# Patient Record
Sex: MALE | Race: WHITE | Employment: OTHER | URBAN - METROPOLITAN AREA
[De-identification: names, ages, dates, MRNs, and addresses within clinical notes are randomized per-mention and may not be internally consistent; named-entity substitution may affect disease eponyms.]

---

## 2023-05-01 ENCOUNTER — HOSPITAL ENCOUNTER (OUTPATIENT)
Dept: CT IMAGING | Age: 48
Discharge: HOME | End: 2023-05-01
Attending: FAMILY MEDICINE
Payer: COMMERCIAL

## 2023-05-01 DIAGNOSIS — R79.89 ELEVATED PLASMA METANEPHRINES: ICD-10-CM

## 2023-05-01 DIAGNOSIS — I10 RESISTANT HYPERTENSION: ICD-10-CM

## 2023-05-01 LAB — CREAT BLD-MCNC: 1.3 MG/DL (ref 0.6–1.3)

## 2023-05-01 PROCEDURE — 82565 ASSAY OF CREATININE: CPT

## 2023-05-01 PROCEDURE — 74177 CT ABD & PELVIS W/CONTRAST: CPT

## 2023-05-01 PROCEDURE — 74011000636 HC RX REV CODE- 636

## 2023-05-01 RX ADMIN — IOPAMIDOL 100 ML: 755 INJECTION, SOLUTION INTRAVENOUS at 08:21

## 2023-05-20 RX ORDER — ATENOLOL 50 MG/1
50 TABLET ORAL DAILY
COMMUNITY
Start: 2012-06-04

## 2023-05-20 RX ORDER — TRIAMTERENE AND HYDROCHLOROTHIAZIDE 37.5; 25 MG/1; MG/1
1 TABLET ORAL DAILY
COMMUNITY
Start: 2012-06-04

## 2023-05-20 RX ORDER — ONDANSETRON 4 MG/1
4 TABLET, ORALLY DISINTEGRATING ORAL EVERY 8 HOURS PRN
COMMUNITY
Start: 2014-03-07

## 2025-01-17 ENCOUNTER — HOSPITAL ENCOUNTER (INPATIENT)
Facility: HOSPITAL | Age: 50
LOS: 3 days | Discharge: HOME | DRG: 639 | End: 2025-01-20
Attending: STUDENT IN AN ORGANIZED HEALTH CARE EDUCATION/TRAINING PROGRAM | Admitting: STUDENT IN AN ORGANIZED HEALTH CARE EDUCATION/TRAINING PROGRAM
Payer: COMMERCIAL

## 2025-01-17 DIAGNOSIS — E11.9 DIABETES MELLITUS, NEW ONSET (HCC): ICD-10-CM

## 2025-01-17 DIAGNOSIS — E11.00 HYPEROSMOLAR HYPERGLYCEMIC STATE (HHS) (HCC): Primary | ICD-10-CM

## 2025-01-17 DIAGNOSIS — E11.00 UNCONTROLLED TYPE 2 DM WITH HYPEROSMOLAR NONKETOTIC HYPERGLYCEMIA (HCC): ICD-10-CM

## 2025-01-17 LAB
ALBUMIN SERPL-MCNC: 3.9 G/DL (ref 3.5–5)
ALBUMIN/GLOB SERPL: 1.1 (ref 1.1–2.2)
ALP SERPL-CCNC: 188 U/L (ref 45–117)
ALT SERPL-CCNC: 46 U/L (ref 12–78)
ANION GAP SERPL CALC-SCNC: 11 MMOL/L (ref 2–12)
AST SERPL-CCNC: 24 U/L (ref 15–37)
BASOPHILS # BLD: 0.04 K/UL (ref 0–0.1)
BASOPHILS NFR BLD: 0.4 % (ref 0–1)
BILIRUB SERPL-MCNC: 0.7 MG/DL (ref 0.2–1)
BUN SERPL-MCNC: 38 MG/DL (ref 6–20)
BUN/CREAT SERPL: 20 (ref 12–20)
CALCIUM SERPL-MCNC: 9.5 MG/DL (ref 8.5–10.1)
CHLORIDE SERPL-SCNC: 86 MMOL/L (ref 97–108)
CO2 SERPL-SCNC: 22 MMOL/L (ref 21–32)
CREAT SERPL-MCNC: 1.94 MG/DL (ref 0.7–1.3)
DIFFERENTIAL METHOD BLD: ABNORMAL
EOSINOPHIL # BLD: 0.11 K/UL (ref 0–0.4)
EOSINOPHIL NFR BLD: 1 % (ref 0–7)
ERYTHROCYTE [DISTWIDTH] IN BLOOD BY AUTOMATED COUNT: 11.4 % (ref 11.5–14.5)
FLUAV RNA SPEC QL NAA+PROBE: NOT DETECTED
FLUBV RNA SPEC QL NAA+PROBE: NOT DETECTED
GLOBULIN SER CALC-MCNC: 3.6 G/DL (ref 2–4)
GLUCOSE BLD STRIP.AUTO-MCNC: 287 MG/DL (ref 65–117)
GLUCOSE BLD STRIP.AUTO-MCNC: 564 MG/DL (ref 65–117)
GLUCOSE BLD STRIP.AUTO-MCNC: >600 MG/DL (ref 65–117)
GLUCOSE BLD STRIP.AUTO-MCNC: >600 MG/DL (ref 65–117)
GLUCOSE SERPL-MCNC: 809 MG/DL (ref 65–100)
HCT VFR BLD AUTO: 39.6 % (ref 36.6–50.3)
HGB BLD-MCNC: 14.7 G/DL (ref 12.1–17)
IMM GRANULOCYTES # BLD AUTO: 0.05 K/UL (ref 0–0.04)
IMM GRANULOCYTES NFR BLD AUTO: 0.5 % (ref 0–0.5)
LYMPHOCYTES # BLD: 2.7 K/UL (ref 0.8–3.5)
LYMPHOCYTES NFR BLD: 24.8 % (ref 12–49)
MCH RBC QN AUTO: 31.7 PG (ref 26–34)
MCHC RBC AUTO-ENTMCNC: 37.1 G/DL (ref 30–36.5)
MCV RBC AUTO: 85.3 FL (ref 80–99)
MONOCYTES # BLD: 0.61 K/UL (ref 0–1)
MONOCYTES NFR BLD: 5.6 % (ref 5–13)
NEUTS SEG # BLD: 7.39 K/UL (ref 1.8–8)
NEUTS SEG NFR BLD: 67.7 % (ref 32–75)
NRBC # BLD: 0 K/UL (ref 0–0.01)
NRBC BLD-RTO: 0 PER 100 WBC
PLATELET # BLD AUTO: 266 K/UL (ref 150–400)
PMV BLD AUTO: 10.5 FL (ref 8.9–12.9)
POTASSIUM SERPL-SCNC: 4.2 MMOL/L (ref 3.5–5.1)
PROT SERPL-MCNC: 7.5 G/DL (ref 6.4–8.2)
RBC # BLD AUTO: 4.64 M/UL (ref 4.1–5.7)
SARS-COV-2 RNA RESP QL NAA+PROBE: NOT DETECTED
SERVICE CMNT-IMP: ABNORMAL
SODIUM SERPL-SCNC: 119 MMOL/L (ref 136–145)
SOURCE: NORMAL
WBC # BLD AUTO: 10.9 K/UL (ref 4.1–11.1)

## 2025-01-17 PROCEDURE — 2580000003 HC RX 258: Performed by: STUDENT IN AN ORGANIZED HEALTH CARE EDUCATION/TRAINING PROGRAM

## 2025-01-17 PROCEDURE — 6360000002 HC RX W HCPCS: Performed by: STUDENT IN AN ORGANIZED HEALTH CARE EDUCATION/TRAINING PROGRAM

## 2025-01-17 PROCEDURE — 85025 COMPLETE CBC W/AUTO DIFF WBC: CPT

## 2025-01-17 PROCEDURE — 6370000000 HC RX 637 (ALT 250 FOR IP): Performed by: STUDENT IN AN ORGANIZED HEALTH CARE EDUCATION/TRAINING PROGRAM

## 2025-01-17 PROCEDURE — 83036 HEMOGLOBIN GLYCOSYLATED A1C: CPT

## 2025-01-17 PROCEDURE — 87636 SARSCOV2 & INF A&B AMP PRB: CPT

## 2025-01-17 PROCEDURE — 2580000003 HC RX 258

## 2025-01-17 PROCEDURE — 99285 EMERGENCY DEPT VISIT HI MDM: CPT

## 2025-01-17 PROCEDURE — 2500000003 HC RX 250 WO HCPCS: Performed by: STUDENT IN AN ORGANIZED HEALTH CARE EDUCATION/TRAINING PROGRAM

## 2025-01-17 PROCEDURE — 2060000000 HC ICU INTERMEDIATE R&B

## 2025-01-17 PROCEDURE — 82962 GLUCOSE BLOOD TEST: CPT

## 2025-01-17 PROCEDURE — 80053 COMPREHEN METABOLIC PANEL: CPT

## 2025-01-17 PROCEDURE — 36415 COLL VENOUS BLD VENIPUNCTURE: CPT

## 2025-01-17 RX ORDER — AMLODIPINE BESYLATE 5 MG/1
5 TABLET ORAL DAILY
Status: DISCONTINUED | OUTPATIENT
Start: 2025-01-17 | End: 2025-01-20 | Stop reason: HOSPADM

## 2025-01-17 RX ORDER — ACETAMINOPHEN 325 MG/1
650 TABLET ORAL EVERY 4 HOURS PRN
Status: DISCONTINUED | OUTPATIENT
Start: 2025-01-17 | End: 2025-01-17

## 2025-01-17 RX ORDER — ACETAMINOPHEN 650 MG/1
650 SUPPOSITORY RECTAL EVERY 6 HOURS PRN
Status: DISCONTINUED | OUTPATIENT
Start: 2025-01-17 | End: 2025-01-20 | Stop reason: HOSPADM

## 2025-01-17 RX ORDER — VALSARTAN 320 MG/1
320 TABLET ORAL DAILY
COMMUNITY

## 2025-01-17 RX ORDER — SODIUM CHLORIDE, SODIUM LACTATE, POTASSIUM CHLORIDE, CALCIUM CHLORIDE 600; 310; 30; 20 MG/100ML; MG/100ML; MG/100ML; MG/100ML
INJECTION, SOLUTION INTRAVENOUS CONTINUOUS
Status: ACTIVE | OUTPATIENT
Start: 2025-01-17 | End: 2025-01-18

## 2025-01-17 RX ORDER — SODIUM CHLORIDE 0.9 % (FLUSH) 0.9 %
5-40 SYRINGE (ML) INJECTION PRN
Status: DISCONTINUED | OUTPATIENT
Start: 2025-01-17 | End: 2025-01-20 | Stop reason: HOSPADM

## 2025-01-17 RX ORDER — POLYETHYLENE GLYCOL 3350 17 G/17G
17 POWDER, FOR SOLUTION ORAL DAILY PRN
Status: DISCONTINUED | OUTPATIENT
Start: 2025-01-17 | End: 2025-01-20 | Stop reason: HOSPADM

## 2025-01-17 RX ORDER — ENOXAPARIN SODIUM 100 MG/ML
40 INJECTION SUBCUTANEOUS DAILY
Status: DISCONTINUED | OUTPATIENT
Start: 2025-01-17 | End: 2025-01-20 | Stop reason: HOSPADM

## 2025-01-17 RX ORDER — AMLODIPINE BESYLATE 5 MG/1
5 TABLET ORAL DAILY
COMMUNITY

## 2025-01-17 RX ORDER — CARVEDILOL 25 MG/1
25 TABLET ORAL 2 TIMES DAILY WITH MEALS
COMMUNITY

## 2025-01-17 RX ORDER — SODIUM CHLORIDE 9 MG/ML
INJECTION, SOLUTION INTRAVENOUS PRN
Status: DISCONTINUED | OUTPATIENT
Start: 2025-01-17 | End: 2025-01-20 | Stop reason: HOSPADM

## 2025-01-17 RX ORDER — ONDANSETRON 4 MG/1
4 TABLET, ORALLY DISINTEGRATING ORAL EVERY 8 HOURS PRN
Status: DISCONTINUED | OUTPATIENT
Start: 2025-01-17 | End: 2025-01-20 | Stop reason: HOSPADM

## 2025-01-17 RX ORDER — INSULIN LISPRO 100 [IU]/ML
0-8 INJECTION, SOLUTION INTRAVENOUS; SUBCUTANEOUS EVERY 4 HOURS
Status: DISCONTINUED | OUTPATIENT
Start: 2025-01-17 | End: 2025-01-18

## 2025-01-17 RX ORDER — SODIUM CHLORIDE 0.9 % (FLUSH) 0.9 %
5-40 SYRINGE (ML) INJECTION EVERY 12 HOURS SCHEDULED
Status: DISCONTINUED | OUTPATIENT
Start: 2025-01-17 | End: 2025-01-20 | Stop reason: HOSPADM

## 2025-01-17 RX ORDER — ESCITALOPRAM OXALATE 10 MG/1
10 TABLET ORAL DAILY
Status: DISCONTINUED | OUTPATIENT
Start: 2025-01-17 | End: 2025-01-20 | Stop reason: HOSPADM

## 2025-01-17 RX ORDER — CARVEDILOL 12.5 MG/1
25 TABLET ORAL 2 TIMES DAILY WITH MEALS
Status: DISCONTINUED | OUTPATIENT
Start: 2025-01-17 | End: 2025-01-20 | Stop reason: HOSPADM

## 2025-01-17 RX ORDER — HYDROCHLOROTHIAZIDE 25 MG/1
25 TABLET ORAL DAILY
COMMUNITY

## 2025-01-17 RX ORDER — 0.9 % SODIUM CHLORIDE 0.9 %
1000 INTRAVENOUS SOLUTION INTRAVENOUS ONCE
Status: COMPLETED | OUTPATIENT
Start: 2025-01-17 | End: 2025-01-17

## 2025-01-17 RX ORDER — ACETAMINOPHEN 325 MG/1
650 TABLET ORAL EVERY 6 HOURS PRN
Status: DISCONTINUED | OUTPATIENT
Start: 2025-01-17 | End: 2025-01-20 | Stop reason: HOSPADM

## 2025-01-17 RX ORDER — ONDANSETRON 2 MG/ML
4 INJECTION INTRAMUSCULAR; INTRAVENOUS EVERY 6 HOURS PRN
Status: DISCONTINUED | OUTPATIENT
Start: 2025-01-17 | End: 2025-01-20 | Stop reason: HOSPADM

## 2025-01-17 RX ORDER — LATANOPROST 50 UG/ML
1 SOLUTION/ DROPS OPHTHALMIC NIGHTLY
Status: DISCONTINUED | OUTPATIENT
Start: 2025-01-17 | End: 2025-01-20 | Stop reason: HOSPADM

## 2025-01-17 RX ORDER — INSULIN GLARGINE 100 [IU]/ML
10 INJECTION, SOLUTION SUBCUTANEOUS NIGHTLY
Status: DISCONTINUED | OUTPATIENT
Start: 2025-01-17 | End: 2025-01-18

## 2025-01-17 RX ORDER — ESCITALOPRAM OXALATE 10 MG/1
10 TABLET ORAL DAILY
COMMUNITY

## 2025-01-17 RX ORDER — GLUCAGON 1 MG/ML
1 KIT INJECTION PRN
Status: DISCONTINUED | OUTPATIENT
Start: 2025-01-17 | End: 2025-01-17

## 2025-01-17 RX ORDER — DEXTROSE MONOHYDRATE 100 MG/ML
INJECTION, SOLUTION INTRAVENOUS CONTINUOUS PRN
Status: DISCONTINUED | OUTPATIENT
Start: 2025-01-17 | End: 2025-01-17

## 2025-01-17 RX ADMIN — SODIUM CHLORIDE, POTASSIUM CHLORIDE, SODIUM LACTATE AND CALCIUM CHLORIDE: 600; 310; 30; 20 INJECTION, SOLUTION INTRAVENOUS at 21:29

## 2025-01-17 RX ADMIN — CARVEDILOL 25 MG: 12.5 TABLET, FILM COATED ORAL at 21:01

## 2025-01-17 RX ADMIN — INSULIN LISPRO 4 UNITS: 100 INJECTION, SOLUTION INTRAVENOUS; SUBCUTANEOUS at 21:47

## 2025-01-17 RX ADMIN — ESCITALOPRAM OXALATE 10 MG: 10 TABLET ORAL at 21:00

## 2025-01-17 RX ADMIN — SODIUM CHLORIDE, PRESERVATIVE FREE 10 ML: 5 INJECTION INTRAVENOUS at 21:02

## 2025-01-17 RX ADMIN — INSULIN HUMAN 10 UNITS: 100 INJECTION, SOLUTION PARENTERAL at 20:28

## 2025-01-17 RX ADMIN — ENOXAPARIN SODIUM 40 MG: 100 INJECTION SUBCUTANEOUS at 21:48

## 2025-01-17 RX ADMIN — LATANOPROST 1 DROP: 50 SOLUTION OPHTHALMIC at 22:51

## 2025-01-17 RX ADMIN — INSULIN GLARGINE 10 UNITS: 100 INJECTION, SOLUTION SUBCUTANEOUS at 20:29

## 2025-01-17 RX ADMIN — SODIUM CHLORIDE 1000 ML: 0.9 INJECTION, SOLUTION INTRAVENOUS at 17:39

## 2025-01-17 ASSESSMENT — PAIN SCALES - GENERAL: PAINLEVEL_OUTOF10: 0

## 2025-01-17 ASSESSMENT — LIFESTYLE VARIABLES
HOW OFTEN DO YOU HAVE A DRINK CONTAINING ALCOHOL: PATIENT DECLINED
HOW MANY STANDARD DRINKS CONTAINING ALCOHOL DO YOU HAVE ON A TYPICAL DAY: PATIENT DECLINED

## 2025-01-18 PROBLEM — E11.00 HYPEROSMOLAR HYPERGLYCEMIC STATE (HHS) (HCC): Status: ACTIVE | Noted: 2025-01-18

## 2025-01-18 LAB
ANION GAP SERPL CALC-SCNC: 9 MMOL/L (ref 2–12)
BASOPHILS # BLD: 0.02 K/UL (ref 0–0.1)
BASOPHILS NFR BLD: 0.2 % (ref 0–1)
BUN SERPL-MCNC: 28 MG/DL (ref 6–20)
BUN/CREAT SERPL: 22 (ref 12–20)
CALCIUM SERPL-MCNC: 9.2 MG/DL (ref 8.5–10.1)
CEA SERPL-MCNC: 3.4 NG/ML
CHLORIDE SERPL-SCNC: 100 MMOL/L (ref 97–108)
CO2 SERPL-SCNC: 24 MMOL/L (ref 21–32)
CREAT SERPL-MCNC: 1.28 MG/DL (ref 0.7–1.3)
DIFFERENTIAL METHOD BLD: ABNORMAL
EOSINOPHIL # BLD: 0.14 K/UL (ref 0–0.4)
EOSINOPHIL NFR BLD: 1.7 % (ref 0–7)
ERYTHROCYTE [DISTWIDTH] IN BLOOD BY AUTOMATED COUNT: 11.5 % (ref 11.5–14.5)
EST. AVERAGE GLUCOSE BLD GHB EST-MCNC: ABNORMAL MG/DL
GLUCOSE BLD STRIP.AUTO-MCNC: 262 MG/DL (ref 65–117)
GLUCOSE BLD STRIP.AUTO-MCNC: 264 MG/DL (ref 65–117)
GLUCOSE BLD STRIP.AUTO-MCNC: 265 MG/DL (ref 65–117)
GLUCOSE BLD STRIP.AUTO-MCNC: 266 MG/DL (ref 65–117)
GLUCOSE BLD STRIP.AUTO-MCNC: 354 MG/DL (ref 65–117)
GLUCOSE BLD STRIP.AUTO-MCNC: 355 MG/DL (ref 65–117)
GLUCOSE SERPL-MCNC: 252 MG/DL (ref 65–100)
HBA1C MFR BLD: >14 % (ref 4–5.6)
HCT VFR BLD AUTO: 35.6 % (ref 36.6–50.3)
HGB BLD-MCNC: 13 G/DL (ref 12.1–17)
IMM GRANULOCYTES # BLD AUTO: 0.03 K/UL (ref 0–0.04)
IMM GRANULOCYTES NFR BLD AUTO: 0.4 % (ref 0–0.5)
LIPASE SERPL-CCNC: 104 U/L (ref 13–75)
LYMPHOCYTES # BLD: 3.46 K/UL (ref 0.8–3.5)
LYMPHOCYTES NFR BLD: 41.6 % (ref 12–49)
MCH RBC QN AUTO: 31.6 PG (ref 26–34)
MCHC RBC AUTO-ENTMCNC: 36.5 G/DL (ref 30–36.5)
MCV RBC AUTO: 86.4 FL (ref 80–99)
MONOCYTES # BLD: 0.51 K/UL (ref 0–1)
MONOCYTES NFR BLD: 6.1 % (ref 5–13)
NEUTS SEG # BLD: 4.16 K/UL (ref 1.8–8)
NEUTS SEG NFR BLD: 50 % (ref 32–75)
NRBC # BLD: 0 K/UL (ref 0–0.01)
NRBC BLD-RTO: 0 PER 100 WBC
PLATELET # BLD AUTO: 198 K/UL (ref 150–400)
PMV BLD AUTO: 10.5 FL (ref 8.9–12.9)
POTASSIUM SERPL-SCNC: 3 MMOL/L (ref 3.5–5.1)
RBC # BLD AUTO: 4.12 M/UL (ref 4.1–5.7)
SERVICE CMNT-IMP: ABNORMAL
SODIUM SERPL-SCNC: 133 MMOL/L (ref 136–145)
TSH SERPL DL<=0.05 MIU/L-ACNC: 0.87 UIU/ML (ref 0.36–3.74)
WBC # BLD AUTO: 8.3 K/UL (ref 4.1–11.1)

## 2025-01-18 PROCEDURE — 6370000000 HC RX 637 (ALT 250 FOR IP): Performed by: INTERNAL MEDICINE

## 2025-01-18 PROCEDURE — 86341 ISLET CELL ANTIBODY: CPT

## 2025-01-18 PROCEDURE — 84443 ASSAY THYROID STIM HORMONE: CPT

## 2025-01-18 PROCEDURE — 36415 COLL VENOUS BLD VENIPUNCTURE: CPT

## 2025-01-18 PROCEDURE — 80048 BASIC METABOLIC PNL TOTAL CA: CPT

## 2025-01-18 PROCEDURE — 82378 CARCINOEMBRYONIC ANTIGEN: CPT

## 2025-01-18 PROCEDURE — 97161 PT EVAL LOW COMPLEX 20 MIN: CPT

## 2025-01-18 PROCEDURE — 83690 ASSAY OF LIPASE: CPT

## 2025-01-18 PROCEDURE — APPNB15 APP NON BILLABLE TIME 0-15 MINS: Performed by: INTERNAL MEDICINE

## 2025-01-18 PROCEDURE — 6370000000 HC RX 637 (ALT 250 FOR IP): Performed by: STUDENT IN AN ORGANIZED HEALTH CARE EDUCATION/TRAINING PROGRAM

## 2025-01-18 PROCEDURE — 85025 COMPLETE CBC W/AUTO DIFF WBC: CPT

## 2025-01-18 PROCEDURE — 82962 GLUCOSE BLOOD TEST: CPT

## 2025-01-18 PROCEDURE — 84681 ASSAY OF C-PEPTIDE: CPT

## 2025-01-18 PROCEDURE — 2060000000 HC ICU INTERMEDIATE R&B

## 2025-01-18 PROCEDURE — 6360000002 HC RX W HCPCS: Performed by: STUDENT IN AN ORGANIZED HEALTH CARE EDUCATION/TRAINING PROGRAM

## 2025-01-18 PROCEDURE — 2580000003 HC RX 258: Performed by: STUDENT IN AN ORGANIZED HEALTH CARE EDUCATION/TRAINING PROGRAM

## 2025-01-18 PROCEDURE — 2500000003 HC RX 250 WO HCPCS: Performed by: STUDENT IN AN ORGANIZED HEALTH CARE EDUCATION/TRAINING PROGRAM

## 2025-01-18 RX ORDER — INSULIN GLARGINE 100 [IU]/ML
20 INJECTION, SOLUTION SUBCUTANEOUS DAILY
Status: DISCONTINUED | OUTPATIENT
Start: 2025-01-18 | End: 2025-01-19

## 2025-01-18 RX ORDER — POTASSIUM CHLORIDE 7.45 MG/ML
10 INJECTION INTRAVENOUS ONCE
Status: COMPLETED | OUTPATIENT
Start: 2025-01-18 | End: 2025-01-18

## 2025-01-18 RX ORDER — INSULIN LISPRO 100 [IU]/ML
0-16 INJECTION, SOLUTION INTRAVENOUS; SUBCUTANEOUS
Status: DISCONTINUED | OUTPATIENT
Start: 2025-01-18 | End: 2025-01-20 | Stop reason: HOSPADM

## 2025-01-18 RX ORDER — GLUCAGON 1 MG/ML
1 KIT INJECTION PRN
Status: DISCONTINUED | OUTPATIENT
Start: 2025-01-18 | End: 2025-01-20 | Stop reason: HOSPADM

## 2025-01-18 RX ORDER — DEXTROSE MONOHYDRATE 100 MG/ML
INJECTION, SOLUTION INTRAVENOUS CONTINUOUS PRN
Status: DISCONTINUED | OUTPATIENT
Start: 2025-01-18 | End: 2025-01-20 | Stop reason: HOSPADM

## 2025-01-18 RX ADMIN — CARVEDILOL 25 MG: 12.5 TABLET, FILM COATED ORAL at 17:15

## 2025-01-18 RX ADMIN — AMLODIPINE BESYLATE 5 MG: 5 TABLET ORAL at 09:34

## 2025-01-18 RX ADMIN — INSULIN LISPRO 16 UNITS: 100 INJECTION, SOLUTION INTRAVENOUS; SUBCUTANEOUS at 12:37

## 2025-01-18 RX ADMIN — ESCITALOPRAM OXALATE 10 MG: 10 TABLET ORAL at 20:50

## 2025-01-18 RX ADMIN — INSULIN LISPRO 4 UNITS: 100 INJECTION, SOLUTION INTRAVENOUS; SUBCUTANEOUS at 04:20

## 2025-01-18 RX ADMIN — POTASSIUM BICARBONATE 40 MEQ: 782 TABLET, EFFERVESCENT ORAL at 06:31

## 2025-01-18 RX ADMIN — SODIUM CHLORIDE, POTASSIUM CHLORIDE, SODIUM LACTATE AND CALCIUM CHLORIDE: 600; 310; 30; 20 INJECTION, SOLUTION INTRAVENOUS at 03:12

## 2025-01-18 RX ADMIN — POTASSIUM CHLORIDE 10 MEQ: 7.46 INJECTION, SOLUTION INTRAVENOUS at 06:32

## 2025-01-18 RX ADMIN — SODIUM CHLORIDE, PRESERVATIVE FREE 10 ML: 5 INJECTION INTRAVENOUS at 20:50

## 2025-01-18 RX ADMIN — ENOXAPARIN SODIUM 40 MG: 100 INJECTION SUBCUTANEOUS at 09:33

## 2025-01-18 RX ADMIN — LATANOPROST 1 DROP: 50 SOLUTION OPHTHALMIC at 20:49

## 2025-01-18 RX ADMIN — SODIUM CHLORIDE, PRESERVATIVE FREE 10 ML: 5 INJECTION INTRAVENOUS at 09:42

## 2025-01-18 RX ADMIN — CARVEDILOL 25 MG: 12.5 TABLET, FILM COATED ORAL at 09:34

## 2025-01-18 RX ADMIN — INSULIN GLARGINE 20 UNITS: 100 INJECTION, SOLUTION SUBCUTANEOUS at 09:35

## 2025-01-18 RX ADMIN — INSULIN LISPRO 8 UNITS: 100 INJECTION, SOLUTION INTRAVENOUS; SUBCUTANEOUS at 20:49

## 2025-01-18 RX ADMIN — INSULIN LISPRO 8 UNITS: 100 INJECTION, SOLUTION INTRAVENOUS; SUBCUTANEOUS at 17:14

## 2025-01-18 RX ADMIN — INSULIN LISPRO 8 UNITS: 100 INJECTION, SOLUTION INTRAVENOUS; SUBCUTANEOUS at 00:20

## 2025-01-18 RX ADMIN — INSULIN LISPRO 8 UNITS: 100 INJECTION, SOLUTION INTRAVENOUS; SUBCUTANEOUS at 09:35

## 2025-01-18 ASSESSMENT — PAIN SCALES - GENERAL
PAINLEVEL_OUTOF10: 0

## 2025-01-18 NOTE — PLAN OF CARE
Problem: Chronic Conditions and Co-morbidities  Goal: Patient's chronic conditions and co-morbidity symptoms are monitored and maintained or improved  1/18/2025 0843 by Sanna Mendenhall RN  Outcome: Progressing  1/18/2025 0046 by Abril Woo RN  Outcome: Progressing     Problem: Discharge Planning  Goal: Discharge to home or other facility with appropriate resources  1/18/2025 0843 by Sanna Mendenhall RN  Outcome: Progressing  1/18/2025 0046 by Abril Woo RN  Outcome: Progressing

## 2025-01-18 NOTE — H&P
Hospitalist Admission Note    NAME:  Rosalio Nguyễn   :  1975   MRN:  [id number]     Date/Time:  2025 9:16 PM    Patient PCP: Dameon Terry MD    ______________________________________________________________________  Given the patient's current clinical presentation, I have a high level of concern for decompensation if discharged from the emergency department.  Complex decision making was performed, which includes reviewing the patient's available past medical records, laboratory results, and x-ray films.       My assessment of this patient's clinical condition and my plan of care is as follows.    Assessment / Plan:    Active Problems:  Diabetes mellitus with hyperosmolar nonketotic hyperglycemia, new diagnosis  Essential hypertension  MDD, ERMI  Glaucoma    Plan:  Diabetes mellitus with hyperosmolar nonketotic hyperglycemia, new diagnosis  Admit to stepdown unit  1 L normal saline given in ED will follow with 200 cc/h lactated Ringer's x 10 hours  Start glargine 10 units nightly  Corrective coverage insulin every 4 hours  -Will keep on sugar-free liquid diet until blood glucose better controlled then start diabetic diet  10 units IV insulin once  Accu-Cheks every 4 hours  Hypoglycemia protocol  Endocrinology consulted, greatly appreciate their expertise  -Given severity of presentation with new onset in a patient with regular medical screenings have some concern for SHARAD and will necessitate specialist evaluation  -Labs: Hemoglobin A1c, TSH, antiislet cell antibody, anti-REMI antibody, C-peptide, CEA, lipase  --If CEA or lipase elevated will pursue pancreatic imaging  Diabetes management consulted, greatly appreciate their expertise  -New diagnosis-education, meter instruction, etc.    Essential hypertension  Continue PTA amlodipine, carvedilol    MDD, REMI  Continue PTA Lexapro    Glaucoma  Formally substitution latanoprost    Medical Decision Making:   I personally reviewed labs: Yes,

## 2025-01-18 NOTE — PROGRESS NOTES
I reviewed the notes and records, this note and plan are based on chart review and phone discussion with the primary team.  New diagnosis of diabetes he presented with BG >600 and was started on IV fluids, given a dose of R 10 units and Lantus 10 units. He was started on a correction scale humalog, based on the hospital protocol.   I agree with his initial plan of care as started by the admitting physician and pt’s sugars are responding well. His BG has improved to the 200. Based on his weight I recommend increasing his Lantus to 20 units daily, with the first dose given this AM. I will place this order.  I recommend continuing to follow the hospital diabetes protocol and on Monday I recommend contacting the inpatient diabetes team for patient education.

## 2025-01-18 NOTE — ED NOTES
Bedside and Verbal shift change report given to Nova RN (oncoming nurse) by Alejandro RN (offgoing nurse). Report included the following information Nurse Handoff Report, ED Encounter Summary, ED SBAR, MAR, and Event Log.

## 2025-01-18 NOTE — PROGRESS NOTES
Pt tolerated PT services well and presents at or near baseline status. Pt received in bed and reports \"feeling much better\". Pt confirms very active and ambulatory without dme at baseline. Pt resides with his wife in a home with stairs navigation need. Pt gently declined unit activity or stairs assessment, but amenable to room ambulation and demo ability to perform with independence including reaching for objects situated on lower height and reaching outside CHANDNI. No LOB and excellent tolerance seen. Bedside chair set up offered, but Pt declined and indicated comfortable in long sit in bed with spouse pending arrival. Pt education re pending dc from [facility] PT services with recommended dc to home with no further PT or dme needs. Pt amenable to the same and confirms no ADL, dressing difficulties, as well.    Accordingly, will dc PT services and yamile \"complete\".    Indigo Nugent, PT, DPT

## 2025-01-18 NOTE — PROGRESS NOTES
End of Shift Note    Bedside shift change report given to Janki EAST (oncoming nurse) by ADILSON LIZAMA RN (offgoing nurse).  Report included the following information SBAR    Shift worked:  7am-7pm     Shift summary and any significant changes:     No signifecant change noted FSBS has been high SS  insuline has been given has been given Endocrinology consolute done increasing his Lantus to 20 units daily, and cont. SS coverage      Concerns for physician to address:  none  i   Zone phone for oncoming shift:          Activity:  Level of Assistance: Independent  Number times ambulated in hallways past shift: 1  Number of times OOB to chair past shift: 2    Cardiac:   Cardiac Monitoring: Yes      Cardiac Rhythm: Sinus rhythm    Access:  Current line(s): PIV     Genitourinary:        Respiratory:   O2 Device: Nasal cannula  Chronic home O2 use?: NO  Incentive spirometer at bedside: NO    GI:     Current diet:  ADULT DIET; Regular; 4 carb choices (60 gm/meal); Low Fat/Low Chol/High Fiber/DERRELL  Passing flatus: YES    Pain Management:   Patient states pain is manageable on current regimen: YES    Skin:  Bull Scale Score: 23  Interventions: Wound Offloading (Prevention Methods): Pillows, Repositioning    Patient Safety:  Fall Risk: Nursing Judgement-Fall Risk High(Add Comments): No  Fall Risk Interventions  Nursing Judgement-Fall Risk High(Add Comments): No  Toilet Every 2 Hours-In Advance of Need: Yes  Hourly Visual Checks: Awake  Fall Visual Posted: Armband  Room Door Open: Deferred to decrease stimulation  Alarm On: Bed, Chair  Patient Moved Closer to Nursing Station: No    Active Consults:   IP CONSULT TO HOSPITALIST  IP CONSULT TO ENDOCRINOLOGY  IP CONSULT TO DIABETES MANAGEMENT    Length of Stay:  Expected LOS: 3  Actual LOS: 1    ADILSON LIZAMA, CRUZITO

## 2025-01-18 NOTE — ED NOTES
TRANSFER - OUT REPORT:    Verbal report given to RN on Rosalio Nguyễn  being transferred to [place] [id number] for routine progression of patient care       Report consisted of patient's Situation, Background, Assessment and   Recommendations(SBAR).     Information from the following report(s) Nurse Handoff Report, ED SBAR, Recent Results, and Cardiac Rhythm NSR  was reviewed with the receiving nurse.    Barry Fall Assessment:    Presents to emergency department  because of falls (Syncope, seizure, or loss of consciousness): No  Age > 70: No  Altered Mental Status, Intoxication with alcohol or substance confusion (Disorientation, impaired judgment, poor safety awaremess, or inability to follow instructions): No  Impaired Mobility: Ambulates or transfers with assistive devices or assistance; Unable to ambulate or transer.: No  Nursing Judgement: No          Lines:   Peripheral IV 01/17/25 Left Antecubital (Active)   Site Assessment Clean, dry & intact 01/17/25 1738   Line Status Flushed 01/17/25 1738   Line Care Cap changed 01/17/25 1738   Phlebitis Assessment No symptoms 01/17/25 1738   Infiltration Assessment 0 01/17/25 1738        Opportunity for questions and clarification was provided.      Patient transported with:  Monitor and Registered Nurse

## 2025-01-18 NOTE — PROGRESS NOTES
Pt is performing mobility and ADLs without assist.  Formal OT assessment isn't indicated at this time.  Will sign off.

## 2025-01-18 NOTE — PROGRESS NOTES
Hospitalist Progress Note    NAME:   Rosalio Nguyễn   : 1975   MRN: [id number]     Date/Time: 2025 2:27 PM  Patient PCP: Dameon Terry MD    Estimated discharge date:  Barriers:       Assessment / Plan:  New diagnosis of diabetes mellitus, HbA1c> 14  Hyperosmolar nonketotic hyperglycemia    --Presented with blood glucose> 600,S/p fluid resuscitation, a dose of regular insulin 10 units plus Lantus 10 units  -Evaluated by endocrinology:  Adjusted dose of Lantus to 20 units daily.  Correction insulin  -Blood glucose at present 200-300s  -Follow-up with diabetic educator team on  prior to discharge  -Creatinine and sodium improved  -Workup for diabetes mellitus:antiislet cell antibody, anti-REMI antibody, C-peptide, CEA - pending   - Ordered lipid panel     Hypokalemia  Potassium 3  Supplemented potassium    Hypertension  MDD, REMI-chronic  Glaucoma  -continue continue amlodipine, carvedilol, Lexapro, latanoprost          Medical Decision Making:   I personally reviewed labs: Yes  I personally reviewed imaging: Yes  I personally reviewed EKG: Yes  Toxic drug monitoring: Insulin-blood glucose  Discussed case with:         Code Status: Full code  DVT Prophylaxis:   GI Prophylaxis:    Subjective:     Chief Complaint / Reason for Physician Visit  \" States feeling better\".  Discussed with RN events overnight.       Objective:     VITALS:   Last 24hrs VS reviewed since prior progress note. Most recent are:  Patient Vitals for the past 24 hrs:   BP Temp Temp src Pulse Resp SpO2 Height Weight   25 1115 101/69 97.7 °F (36.5 °C) Oral 74 14 97 % -- --   25 0745 (!) 136/95 97.2 °F (36.2 °C) Oral 68 13 98 % -- --   25 0545 -- -- -- -- -- -- -- 97.6 kg (215 lb 2.7 oz)   25 0335 -- 98 °F (36.7 °C) Oral -- -- -- -- --   25 2232 -- 98.1 °F (36.7 °C) Oral -- -- -- -- --   25 2151 118/89 -- -- -- -- -- -- --   25 (!) 145/100 99 °F (37.2 °C) -- 85 18 98 % --

## 2025-01-18 NOTE — PROGRESS NOTES
PHYSICAL THERAPY EVALUATION/DISCHARGE    Patient: Rosalio Nguyễn (49 y.o. male)  Date: 1/18/2025  Primary Diagnosis: Uncontrolled type 2 DM with hyperosmolar nonketotic hyperglycemia (HCC) [E11.00]  Hyperosmolar hyperglycemic state (HHS) (HCC) [E11.00]       Precautions:  (falls)                      ASSESSMENT AND RECOMMENDATIONS:    Pt tolerated PT services well and presents at or near baseline status. Pt received in bed and reports \"feeling much better\". Pt confirms very active and ambulatory without dme at baseline. Pt resides with his wife in a home with stairs navigation need. Pt gently declined unit activity or stairs assessment, but amenable to room ambulation and demo ability to perform with independence including reaching for objects situated on lower height and reaching outside CHANDNI. No LOB and excellent tolerance seen. Bedside chair set up offered, but Pt declined and indicated comfortable in long sit in bed with spouse pending arrival. Pt education re pending dc from [facility] PT services with recommended dc to home with no further PT or dme needs. Pt amenable to the same and confirms no ADL, dressing difficulties, as well.     Accordingly, will dc PT services and yamile \"complete\".    Functional Outcome Measure:  The patient scored 23/24 on the [facility] outcome measure which is indicative of low fall risk.         PLAN :  Recommendation for discharge: (in order for the patient to meet his/her long term goals):   No skilled physical therapy    Other factors to consider for discharge:  medical status.    IF patient discharges home will need the following DME: none       SUBJECTIVE:   Patient stated “I feel much better.”    OBJECTIVE DATA SUMMARY:     Past Medical History:   Diagnosis Date    Fatty liver     HTN (hypertension) 6/4/2012     Past Surgical History:   Procedure Laterality Date    TONSILLECTOMY         Home Situation and Prior Level of Function:   Social/Functional History  Lives With: Spouse  Type

## 2025-01-18 NOTE — ED PROVIDER NOTES
Nucleated Red Blood Cells 0.0   Nucleated Red Blood Cells 0.00   Neutrophils % 67.7   Lymphocyte % 24.8   Monocytes % 5.6   Eosinophils % 1.0   Basophils % 0.4   Immature Granulocytes % 0.5   Neutrophils Absolute 7.39   Lymphocytes Absolute 2.70   Monocytes Absolute 0.61   Eosinophils Absolute 0.11   Basophils Absolute 0.04   Immature Granulocytes Absolute 0.05(!)   Differential Type AUTOMATED [RT]   1837 POCT Glucose(!!):    POC Glucose >600(!!)   Performed by: Ernestina Pérez RN [RT]   1837 CMP(!!):    Sodium 119(!!)   Potassium 4.2   Chloride 86(!)   CARBON DIOXIDE 22   Anion Gap 11   Glucose 809(!!)   BUN,BUNPL 38(!)   Creatinine 1.94(!)   Bun/Cre 20   Est, Glom Filt Rate 41(!)   Calcium 9.5   Total Bilirubin 0.7   ALT 46   AST 24   Alkaline Phosphatase 188(!)   Total Protein 7.5   Albumin 3.9   Globulin 3.6   Albumin/Globulin Ratio 1.1 [RT]   1850 Consulted with hospitalist, will admit patient. [RT]   1855 Shared Decision Making with Patient and Patient's Spouse.  Discussed inpatient admission for further management of hyperglycemic state. Discussed consultation made with hospitalist who agrees to admit patient.  Patient and patient's partner in agreement with admission.  Patient is stable for admission. [RT]      ED Course User Index  [RT] Farhana Laura, APRN - CNP       Disposition Considerations (Tests not done, Shared Decision Making, Pt Expectation of Test or Tx.): As above     FINAL IMPRESSION     1. Hyperosmolar hyperglycemic state (HHS) (HCC)          DISPOSITION/PLAN   DISPOSITION     DISPOSITION CONDITION Stable     Admit Note: Pt is being admitted by Hospitalist. The results of their tests and reason(s) for their admission have been discussed with pt and/or available family. They convey agreement and understanding for the need to be admitted and for the admission diagnosis.      I have discussed the history and physical, results, MDM, and treatment plan with my supervising physician, Jack  Detail Level: None Performed By: Whit Eddy Urine Pregnancy Test Result: negative

## 2025-01-19 LAB
ANION GAP SERPL CALC-SCNC: 7 MMOL/L (ref 2–12)
BASOPHILS # BLD: 0.03 K/UL (ref 0–0.1)
BASOPHILS NFR BLD: 0.4 % (ref 0–1)
BUN SERPL-MCNC: 32 MG/DL (ref 6–20)
BUN/CREAT SERPL: 25 (ref 12–20)
CALCIUM SERPL-MCNC: 9 MG/DL (ref 8.5–10.1)
CHLORIDE SERPL-SCNC: 103 MMOL/L (ref 97–108)
CHOLEST SERPL-MCNC: 179 MG/DL
CO2 SERPL-SCNC: 26 MMOL/L (ref 21–32)
CREAT SERPL-MCNC: 1.26 MG/DL (ref 0.7–1.3)
DIFFERENTIAL METHOD BLD: ABNORMAL
EOSINOPHIL # BLD: 0.11 K/UL (ref 0–0.4)
EOSINOPHIL NFR BLD: 1.5 % (ref 0–7)
ERYTHROCYTE [DISTWIDTH] IN BLOOD BY AUTOMATED COUNT: 11.4 % (ref 11.5–14.5)
GLUCOSE BLD STRIP.AUTO-MCNC: 255 MG/DL (ref 65–117)
GLUCOSE BLD STRIP.AUTO-MCNC: 267 MG/DL (ref 65–117)
GLUCOSE BLD STRIP.AUTO-MCNC: 287 MG/DL (ref 65–117)
GLUCOSE BLD STRIP.AUTO-MCNC: 292 MG/DL (ref 65–117)
GLUCOSE SERPL-MCNC: 224 MG/DL (ref 65–100)
HCT VFR BLD AUTO: 35.6 % (ref 36.6–50.3)
HDLC SERPL-MCNC: 32 MG/DL
HDLC SERPL: 5.6 (ref 0–5)
HGB BLD-MCNC: 12.7 G/DL (ref 12.1–17)
IMM GRANULOCYTES # BLD AUTO: 0.05 K/UL (ref 0–0.04)
IMM GRANULOCYTES NFR BLD AUTO: 0.7 % (ref 0–0.5)
LDLC SERPL CALC-MCNC: 79.8 MG/DL (ref 0–100)
LYMPHOCYTES # BLD: 3.13 K/UL (ref 0.8–3.5)
LYMPHOCYTES NFR BLD: 41.3 % (ref 12–49)
MAGNESIUM SERPL-MCNC: 2 MG/DL (ref 1.6–2.4)
MCH RBC QN AUTO: 31.6 PG (ref 26–34)
MCHC RBC AUTO-ENTMCNC: 35.7 G/DL (ref 30–36.5)
MCV RBC AUTO: 88.6 FL (ref 80–99)
MONOCYTES # BLD: 0.57 K/UL (ref 0–1)
MONOCYTES NFR BLD: 7.5 % (ref 5–13)
NEUTS SEG # BLD: 3.68 K/UL (ref 1.8–8)
NEUTS SEG NFR BLD: 48.6 % (ref 32–75)
NRBC # BLD: 0 K/UL (ref 0–0.01)
NRBC BLD-RTO: 0 PER 100 WBC
PHOSPHATE SERPL-MCNC: 3.6 MG/DL (ref 2.6–4.7)
PLATELET # BLD AUTO: 168 K/UL (ref 150–400)
PMV BLD AUTO: 10.5 FL (ref 8.9–12.9)
POTASSIUM SERPL-SCNC: 3.5 MMOL/L (ref 3.5–5.1)
RBC # BLD AUTO: 4.02 M/UL (ref 4.1–5.7)
SERVICE CMNT-IMP: ABNORMAL
SODIUM SERPL-SCNC: 136 MMOL/L (ref 136–145)
TRIGL SERPL-MCNC: 336 MG/DL
VLDLC SERPL CALC-MCNC: 67.2 MG/DL
WBC # BLD AUTO: 7.6 K/UL (ref 4.1–11.1)

## 2025-01-19 PROCEDURE — 36415 COLL VENOUS BLD VENIPUNCTURE: CPT

## 2025-01-19 PROCEDURE — 84100 ASSAY OF PHOSPHORUS: CPT

## 2025-01-19 PROCEDURE — 2500000003 HC RX 250 WO HCPCS: Performed by: STUDENT IN AN ORGANIZED HEALTH CARE EDUCATION/TRAINING PROGRAM

## 2025-01-19 PROCEDURE — 6370000000 HC RX 637 (ALT 250 FOR IP): Performed by: STUDENT IN AN ORGANIZED HEALTH CARE EDUCATION/TRAINING PROGRAM

## 2025-01-19 PROCEDURE — 2060000000 HC ICU INTERMEDIATE R&B

## 2025-01-19 PROCEDURE — 85025 COMPLETE CBC W/AUTO DIFF WBC: CPT

## 2025-01-19 PROCEDURE — 83735 ASSAY OF MAGNESIUM: CPT

## 2025-01-19 PROCEDURE — 82962 GLUCOSE BLOOD TEST: CPT

## 2025-01-19 PROCEDURE — 80061 LIPID PANEL: CPT

## 2025-01-19 PROCEDURE — 80048 BASIC METABOLIC PNL TOTAL CA: CPT

## 2025-01-19 PROCEDURE — 6370000000 HC RX 637 (ALT 250 FOR IP): Performed by: INTERNAL MEDICINE

## 2025-01-19 RX ORDER — VALSARTAN 160 MG/1
320 TABLET ORAL DAILY
Status: DISCONTINUED | OUTPATIENT
Start: 2025-01-19 | End: 2025-01-20 | Stop reason: HOSPADM

## 2025-01-19 RX ORDER — ATORVASTATIN CALCIUM 10 MG/1
10 TABLET, FILM COATED ORAL NIGHTLY
Status: DISCONTINUED | OUTPATIENT
Start: 2025-01-19 | End: 2025-01-20 | Stop reason: HOSPADM

## 2025-01-19 RX ORDER — POTASSIUM CHLORIDE 1500 MG/1
20 TABLET, EXTENDED RELEASE ORAL ONCE
Status: COMPLETED | OUTPATIENT
Start: 2025-01-19 | End: 2025-01-19

## 2025-01-19 RX ORDER — INSULIN GLARGINE 100 [IU]/ML
25 INJECTION, SOLUTION SUBCUTANEOUS DAILY
Status: DISCONTINUED | OUTPATIENT
Start: 2025-01-20 | End: 2025-01-20 | Stop reason: HOSPADM

## 2025-01-19 RX ADMIN — SODIUM CHLORIDE, PRESERVATIVE FREE 10 ML: 5 INJECTION INTRAVENOUS at 20:47

## 2025-01-19 RX ADMIN — AMLODIPINE BESYLATE 5 MG: 5 TABLET ORAL at 08:27

## 2025-01-19 RX ADMIN — LATANOPROST 1 DROP: 50 SOLUTION OPHTHALMIC at 20:37

## 2025-01-19 RX ADMIN — ESCITALOPRAM OXALATE 10 MG: 10 TABLET ORAL at 20:37

## 2025-01-19 RX ADMIN — VALSARTAN 320 MG: 160 TABLET, FILM COATED ORAL at 10:01

## 2025-01-19 RX ADMIN — INSULIN GLARGINE 20 UNITS: 100 INJECTION, SOLUTION SUBCUTANEOUS at 08:30

## 2025-01-19 RX ADMIN — CARVEDILOL 25 MG: 12.5 TABLET, FILM COATED ORAL at 08:27

## 2025-01-19 RX ADMIN — INSULIN LISPRO 8 UNITS: 100 INJECTION, SOLUTION INTRAVENOUS; SUBCUTANEOUS at 12:39

## 2025-01-19 RX ADMIN — INSULIN LISPRO 8 UNITS: 100 INJECTION, SOLUTION INTRAVENOUS; SUBCUTANEOUS at 08:31

## 2025-01-19 RX ADMIN — SODIUM CHLORIDE, PRESERVATIVE FREE 10 ML: 5 INJECTION INTRAVENOUS at 08:33

## 2025-01-19 RX ADMIN — INSULIN LISPRO 8 UNITS: 100 INJECTION, SOLUTION INTRAVENOUS; SUBCUTANEOUS at 17:08

## 2025-01-19 RX ADMIN — ATORVASTATIN CALCIUM 10 MG: 10 TABLET, FILM COATED ORAL at 20:37

## 2025-01-19 RX ADMIN — INSULIN LISPRO 8 UNITS: 100 INJECTION, SOLUTION INTRAVENOUS; SUBCUTANEOUS at 20:36

## 2025-01-19 RX ADMIN — CARVEDILOL 25 MG: 12.5 TABLET, FILM COATED ORAL at 17:08

## 2025-01-19 RX ADMIN — POTASSIUM CHLORIDE 20 MEQ: 1500 TABLET, EXTENDED RELEASE ORAL at 08:26

## 2025-01-19 ASSESSMENT — PAIN SCALES - GENERAL: PAINLEVEL_OUTOF10: 0

## 2025-01-19 NOTE — PROGRESS NOTES
End of Shift Note    Bedside shift change report given to  (oncoming nurse) by Eliseo Woo RN (offgoing nurse).  Report included the following information SBAR    Shift worked:   7pm to 7am      Shift summary and any significant changes:    Blood sugar management ongoing current levels is 224mg/dl no new concern     Concerns for physician to address:       Zone phone for oncoming shift:          Activity:  Level of Assistance: Independent  Number times ambulated in hallways past shift: 0  Number of times OOB to chair past shift: 0    Cardiac:   Cardiac Monitoring: Yes      Cardiac Rhythm: Sinus rhythm    Access:  Current line(s): PIV     Genitourinary:        Respiratory:   O2 Device: None (Room air)  Chronic home O2 use?: NO  Incentive spirometer at bedside: YES    GI:     Current diet:  ADULT DIET; Regular; 4 carb choices (60 gm/meal); Low Fat/Low Chol/High Fiber/DERRELL  Passing flatus: YES    Pain Management:   Patient states pain is manageable on current regimen: YES    Skin:  Bull Scale Score: 21  Interventions: Wound Offloading (Prevention Methods): Pillows, Repositioning    Patient Safety:  Fall Risk: Nursing Judgement-Fall Risk High(Add Comments): No  Fall Risk Interventions  Nursing Judgement-Fall Risk High(Add Comments): No  Toilet Every 2 Hours-In Advance of Need: Yes  Hourly Visual Checks: Awake, In chair  Fall Visual Posted: Armband  Room Door Open: Deferred to decrease stimulation  Alarm On: Bed, Chair  Patient Moved Closer to Nursing Station: No    Active Consults:   IP CONSULT TO HOSPITALIST  IP CONSULT TO ENDOCRINOLOGY  IP CONSULT TO DIABETES MANAGEMENT    Length of Stay:  Expected LOS: 3  Actual LOS: 2    Eliseo Woo RN

## 2025-01-19 NOTE — PROGRESS NOTES
Hospitalist Progress Note    NAME:   Rosalio Nguyễn   : 1975   MRN: [id number]     Date/Time: 2025 2:11 PM  Patient PCP: Dameon Terry MD    Estimated discharge date:  Barriers:       Assessment / Plan:  New diagnosis of diabetes mellitus, HbA1c> 14  Hyperosmolar nonketotic hyperglycemia    --Presented with blood glucose> 600,S/p fluid resuscitation, a dose of regular insulin 10 units plus Lantus 10 units  -Evaluated by endocrinology:  Adjusted dose of Lantus to 25 units daily from 20 units on .  High dose Correction insulin  -Blood glucose at present 200s  -Follow-up with diabetic educator team on  prior to discharge  -Creatinine and sodium improved  - CEA - 3.4 , lipase mildly elevated - no abdominal pain   - TG in 336 , Will start statin - LFT WNL   -Workup for diabetes mellitus:antiislet cell antibody, anti-REMI antibody, C-peptide,  pending       Hypokalemia- resolved   Potassium 3.5  Supplemented potassium    Hypertension  MDD, REMI-chronic  Glaucoma  -continue continue amlodipine, carvedilol, Lexapro, latanoprost  - Valsartan resumed as creatinine improved.           Medical Decision Making:   I personally reviewed labs: Yes  I personally reviewed imaging: Yes  I personally reviewed EKG: Yes  Toxic drug monitoring: Insulin-blood glucose  Discussed case with: Patient         Code Status: Full code  DVT Prophylaxis:   GI Prophylaxis:    Subjective:     Chief Complaint / Reason for Physician Visit  \" States feeling better\".  Discussed with RN events overnight.       Objective:     VITALS:   Last 24hrs VS reviewed since prior progress note. Most recent are:  Patient Vitals for the past 24 hrs:   BP Temp Temp src Pulse Resp SpO2   25 1142 110/72 97.8 °F (36.6 °C) Oral 76 15 99 %   25 1044 110/72 -- Oral 73 19 --   25 0740 (!) 131/94 97.6 °F (36.4 °C) Oral 70 19 97 %   25 0251 119/81 97.8 °F (36.6 °C) Oral -- 13 --   25 2249 -- 97.8 °F (36.6 °C)

## 2025-01-19 NOTE — PLAN OF CARE
Problem: Chronic Conditions and Co-morbidities  Goal: Patient's chronic conditions and co-morbidity symptoms are monitored and maintained or improved  1/19/2025 0945 by Herminia Barrera RN  Outcome: Progressing  1/18/2025 2324 by Abril Woo RN  Outcome: Progressing     Problem: Discharge Planning  Goal: Discharge to home or other facility with appropriate resources  1/19/2025 0945 by Herminia Barrera RN  Outcome: Progressing  1/18/2025 2324 by Abril Woo RN  Outcome: Progressing

## 2025-01-20 VITALS
SYSTOLIC BLOOD PRESSURE: 130 MMHG | BODY MASS INDEX: 31.38 KG/M2 | HEIGHT: 69 IN | WEIGHT: 211.86 LBS | DIASTOLIC BLOOD PRESSURE: 87 MMHG | OXYGEN SATURATION: 97 % | RESPIRATION RATE: 16 BRPM | HEART RATE: 65 BPM | TEMPERATURE: 97.8 F

## 2025-01-20 PROBLEM — E11.00 UNCONTROLLED TYPE 2 DM WITH HYPEROSMOLAR NONKETOTIC HYPERGLYCEMIA (HCC): Status: RESOLVED | Noted: 2025-01-17 | Resolved: 2025-01-20

## 2025-01-20 PROBLEM — E11.9 DIABETES MELLITUS, NEW ONSET (HCC): Status: ACTIVE | Noted: 2025-01-20

## 2025-01-20 PROBLEM — E11.00 HYPEROSMOLAR HYPERGLYCEMIC STATE (HHS) (HCC): Status: RESOLVED | Noted: 2025-01-18 | Resolved: 2025-01-20

## 2025-01-20 LAB
ANION GAP SERPL CALC-SCNC: 7 MMOL/L (ref 2–12)
BASOPHILS # BLD: 0.02 K/UL (ref 0–0.1)
BASOPHILS NFR BLD: 0.3 % (ref 0–1)
BUN SERPL-MCNC: 24 MG/DL (ref 6–20)
BUN/CREAT SERPL: 21 (ref 12–20)
CALCIUM SERPL-MCNC: 8.8 MG/DL (ref 8.5–10.1)
CHLORIDE SERPL-SCNC: 104 MMOL/L (ref 97–108)
CO2 SERPL-SCNC: 25 MMOL/L (ref 21–32)
CREAT SERPL-MCNC: 1.12 MG/DL (ref 0.7–1.3)
DIFFERENTIAL METHOD BLD: ABNORMAL
EOSINOPHIL # BLD: 0.11 K/UL (ref 0–0.4)
EOSINOPHIL NFR BLD: 1.8 % (ref 0–7)
ERYTHROCYTE [DISTWIDTH] IN BLOOD BY AUTOMATED COUNT: 11.5 % (ref 11.5–14.5)
GLUCOSE BLD STRIP.AUTO-MCNC: 231 MG/DL (ref 65–117)
GLUCOSE BLD STRIP.AUTO-MCNC: 323 MG/DL (ref 65–117)
GLUCOSE SERPL-MCNC: 223 MG/DL (ref 65–100)
HCT VFR BLD AUTO: 35.7 % (ref 36.6–50.3)
HGB BLD-MCNC: 12.5 G/DL (ref 12.1–17)
IMM GRANULOCYTES # BLD AUTO: 0.04 K/UL (ref 0–0.04)
IMM GRANULOCYTES NFR BLD AUTO: 0.6 % (ref 0–0.5)
LYMPHOCYTES # BLD: 2.41 K/UL (ref 0.8–3.5)
LYMPHOCYTES NFR BLD: 39.1 % (ref 12–49)
MAGNESIUM SERPL-MCNC: 2 MG/DL (ref 1.6–2.4)
MCH RBC QN AUTO: 31.1 PG (ref 26–34)
MCHC RBC AUTO-ENTMCNC: 35 G/DL (ref 30–36.5)
MCV RBC AUTO: 88.8 FL (ref 80–99)
MONOCYTES # BLD: 0.53 K/UL (ref 0–1)
MONOCYTES NFR BLD: 8.6 % (ref 5–13)
NEUTS SEG # BLD: 3.05 K/UL (ref 1.8–8)
NEUTS SEG NFR BLD: 49.6 % (ref 32–75)
NRBC # BLD: 0 K/UL (ref 0–0.01)
NRBC BLD-RTO: 0 PER 100 WBC
PHOSPHATE SERPL-MCNC: 3.9 MG/DL (ref 2.6–4.7)
PLATELET # BLD AUTO: 165 K/UL (ref 150–400)
PMV BLD AUTO: 10.5 FL (ref 8.9–12.9)
POTASSIUM SERPL-SCNC: 3.4 MMOL/L (ref 3.5–5.1)
RBC # BLD AUTO: 4.02 M/UL (ref 4.1–5.7)
SERVICE CMNT-IMP: ABNORMAL
SERVICE CMNT-IMP: ABNORMAL
SODIUM SERPL-SCNC: 136 MMOL/L (ref 136–145)
WBC # BLD AUTO: 6.2 K/UL (ref 4.1–11.1)

## 2025-01-20 PROCEDURE — 84100 ASSAY OF PHOSPHORUS: CPT

## 2025-01-20 PROCEDURE — 6370000000 HC RX 637 (ALT 250 FOR IP): Performed by: STUDENT IN AN ORGANIZED HEALTH CARE EDUCATION/TRAINING PROGRAM

## 2025-01-20 PROCEDURE — 85025 COMPLETE CBC W/AUTO DIFF WBC: CPT

## 2025-01-20 PROCEDURE — 2500000003 HC RX 250 WO HCPCS: Performed by: STUDENT IN AN ORGANIZED HEALTH CARE EDUCATION/TRAINING PROGRAM

## 2025-01-20 PROCEDURE — 82962 GLUCOSE BLOOD TEST: CPT

## 2025-01-20 PROCEDURE — 83735 ASSAY OF MAGNESIUM: CPT

## 2025-01-20 PROCEDURE — 36415 COLL VENOUS BLD VENIPUNCTURE: CPT

## 2025-01-20 PROCEDURE — 80048 BASIC METABOLIC PNL TOTAL CA: CPT

## 2025-01-20 PROCEDURE — 6370000000 HC RX 637 (ALT 250 FOR IP): Performed by: INTERNAL MEDICINE

## 2025-01-20 PROCEDURE — 99222 1ST HOSP IP/OBS MODERATE 55: CPT

## 2025-01-20 RX ORDER — INSULIN GLARGINE 100 [IU]/ML
28 INJECTION, SOLUTION SUBCUTANEOUS DAILY
Qty: 5 ADJUSTABLE DOSE PRE-FILLED PEN SYRINGE | Refills: 3 | Status: SHIPPED | OUTPATIENT
Start: 2025-01-20

## 2025-01-20 RX ORDER — POTASSIUM CHLORIDE 1500 MG/1
20 TABLET, EXTENDED RELEASE ORAL ONCE
Status: COMPLETED | OUTPATIENT
Start: 2025-01-20 | End: 2025-01-20

## 2025-01-20 RX ORDER — PEN NEEDLE, DIABETIC 31 GX5/16"
1 NEEDLE, DISPOSABLE MISCELLANEOUS 4 TIMES DAILY
Qty: 100 EACH | Refills: 3 | Status: SHIPPED | OUTPATIENT
Start: 2025-01-20 | End: 2025-04-20

## 2025-01-20 RX ORDER — INSULIN ASPART 100 [IU]/ML
8 INJECTION, SOLUTION INTRAVENOUS; SUBCUTANEOUS
Qty: 5 ADJUSTABLE DOSE PRE-FILLED PEN SYRINGE | Refills: 3 | Status: SHIPPED | OUTPATIENT
Start: 2025-01-20

## 2025-01-20 RX ORDER — ATORVASTATIN CALCIUM 10 MG/1
10 TABLET, FILM COATED ORAL NIGHTLY
Qty: 30 TABLET | Refills: 3 | Status: SHIPPED | OUTPATIENT
Start: 2025-01-20

## 2025-01-20 RX ORDER — LANCETS 30 GAUGE
1 EACH MISCELLANEOUS DAILY
Qty: 100 EACH | Refills: 5 | Status: SHIPPED | OUTPATIENT
Start: 2025-01-20

## 2025-01-20 RX ORDER — GLUCOSAMINE HCL/CHONDROITIN SU 500-400 MG
CAPSULE ORAL
Qty: 100 STRIP | Refills: 0 | Status: SHIPPED | OUTPATIENT
Start: 2025-01-20

## 2025-01-20 RX ORDER — BLOOD-GLUCOSE METER
1 KIT MISCELLANEOUS DAILY
Qty: 1 KIT | Refills: 0 | Status: SHIPPED | OUTPATIENT
Start: 2025-01-20

## 2025-01-20 RX ORDER — HYDROCHLOROTHIAZIDE 12.5 MG/1
CAPSULE ORAL
Qty: 2 EACH | Refills: 2 | Status: SHIPPED | OUTPATIENT
Start: 2025-01-20

## 2025-01-20 RX ADMIN — POTASSIUM CHLORIDE 20 MEQ: 1500 TABLET, EXTENDED RELEASE ORAL at 09:10

## 2025-01-20 RX ADMIN — AMLODIPINE BESYLATE 5 MG: 5 TABLET ORAL at 08:58

## 2025-01-20 RX ADMIN — INSULIN GLARGINE 25 UNITS: 100 INJECTION, SOLUTION SUBCUTANEOUS at 08:59

## 2025-01-20 RX ADMIN — VALSARTAN 320 MG: 160 TABLET, FILM COATED ORAL at 08:58

## 2025-01-20 RX ADMIN — CARVEDILOL 25 MG: 12.5 TABLET, FILM COATED ORAL at 08:58

## 2025-01-20 RX ADMIN — SODIUM CHLORIDE, PRESERVATIVE FREE 10 ML: 5 INJECTION INTRAVENOUS at 08:58

## 2025-01-20 RX ADMIN — INSULIN LISPRO 4 UNITS: 100 INJECTION, SOLUTION INTRAVENOUS; SUBCUTANEOUS at 08:59

## 2025-01-20 RX ADMIN — INSULIN LISPRO 12 UNITS: 100 INJECTION, SOLUTION INTRAVENOUS; SUBCUTANEOUS at 12:02

## 2025-01-20 NOTE — DISCHARGE SUMMARY
Discharge Summary    Name: Rosalio Nguễyn  [id number]  YOB: 1975 (Age: 49 y.o.)   Date of Admission: 1/17/2025  Date of Discharge: 1/20/2025  Attending Physician: Alexandrea Barriga MD    Discharge Diagnosis:   New diagnosis of diabetes mellitus, HbA1c> 14  Hyperosmolar nonketotic hyperglycemia  Hypokalemia- resolved   Hypertension  MDD, REMI-chronic  Glaucoma    Consultations:  IP CONSULT TO HOSPITALIST  IP CONSULT TO ENDOCRINOLOGY  IP CONSULT TO DIABETES MANAGEMENT      Brief Admission History/Reason for Admission Per Dameon Wilkerson, DO:   Rosalio Nguyễn is a 49 y.o.  male with PMHx as listed below presenting to the emergency department at PCP direction after labs demonstrated hyperglycemia.  Patient reports he has been struggling with \"the back end of COVID or some virus\" describing cough, generalized malaise, lethargy.  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  Reports no history of diabetes or prediabetes with last nonfasting blood sugar of 130 per PCP.  Reports his mother had adult onset diabetes with multiple medical complications as a result.  Denies recent travel.  Does not consume unpasteurized food.  Denies central or family history of autoimmune disorders.  Denies B symptoms.  ROS otherwise negative.  Denies tobacco or illicit drug use.  Reports occasional alcohol consumption.     In the ED, patient afebrile and hemodynamically stable (hypertensive 140s/100), saturating upper 90s on room air.  COVID and flu negative.  Labs demonstrate: CBC grossly unremarkable, sodium 119 (corrects to 136), sodium 4.2, chloride 86, bicarb 22, anion gap 11, glucose 809, BUN 38, creatinine 1.94, LFTs grossly unremarkable.  Patient given 1 L normal saline by ED provider.    Brief Hospital Course by Main Problems:   --Presented with blood glucose> 600,S/p fluid resuscitation, a dose of

## 2025-01-20 NOTE — CARE COORDINATION
01/20/25 1426   Service Assessment   Patient Orientation  --    Cognition  --    History Provided By  --    Primary Caregiver  --    Accompanied By/Relationship spouse   Support Systems  --    Patient's Healthcare Decision Maker is:  --    PCP Verified by CM  --    Last Visit to PCP  --    Prior Functional Level  --    Current Functional Level  --    Can patient return to prior living arrangement  --    Ability to make needs known:  --    Family able to assist with home care needs:  --    Would you like for me to discuss the discharge plan with any other family members/significant others, and if so, who?   (wife)   Financial Resources  --    Social/Functional History   Lives With  --    Type of Home  --    Bathroom Toilet  --    Bathroom Accessibility  --    Home Equipment  --    Receives Help From  --    Prior Level of Assist for ADLs  --    Prior Level of Assist for Homemaking  --    Ambulation Assistance  --    Prior Level of Assist for Transfers  --    Active   --    Mode of Transportation  --    Occupation  --    Discharge Planning   Type of Residence  --    Living Arrangements  --    Current Services Prior To Admission  --    Potential Assistance Needed  --    DME Ordered?  --    Potential Assistance Purchasing Medications  --    Type of Home Care Services  --    Patient expects to be discharged to: House   Services At/After Discharge    Resource Information Provided?  --    Mode of Transport at Discharge Other (see comment)   Confirm Follow Up Transport Family   Condition of Participation: Discharge Planning   The Plan for Transition of Care is related to the following treatment goals: PCP and specialist   The Patient and/or Patient Representative was provided with a Choice of Provider? Patient   The Patient and/Or Patient Representative agree with the Discharge Plan? Yes   Freedom of Choice list was provided with basic dialogue that supports the patient's individualized plan of care/goals,

## 2025-01-20 NOTE — CARE COORDINATION
Care Management Initial Assessment       RUR:5%  Readmission? No  1st IM letter given? No  1st  letter given: No     01/20/25 1426   Service Assessment   Patient Orientation Alert and Oriented   Cognition Alert   History Provided By Patient   Primary Caregiver Self   Accompanied By/Relationship spouse   Support Systems Spouse/Significant Other   Patient's Healthcare Decision Maker is: Legal Next of Kin   PCP Verified by CM Yes   Last Visit to PCP Within last 3 months   Prior Functional Level Independent in ADLs/IADLs   Current Functional Level Independent in ADLs/IADLs   Can patient return to prior living arrangement Yes   Ability to make needs known: Good   Family able to assist with home care needs: Yes   Would you like for me to discuss the discharge plan with any other family members/significant others, and if so, who? Yes  (wife)   Financial Resources Other (Comment)   Social/Functional History   Lives With Spouse   Type of Home House   Bathroom Toilet Standard   Bathroom Accessibility Accessible   Home Equipment None   Receives Help From Family   Prior Level of Assist for ADLs Independent   Prior Level of Assist for Homemaking Independent   Ambulation Assistance Independent   Prior Level of Assist for Transfers Independent   Active  Yes   Mode of Transportation Car   Occupation Full time employment   Discharge Planning   Type of Residence House   Living Arrangements Spouse/Significant Other   Current Services Prior To Admission None   Potential Assistance Needed N/A   DME Ordered? No   Potential Assistance Purchasing Medications No   Type of Home Care Services None   Patient expects to be discharged to: House   Services At/After Discharge   [place] Resource Information Provided? No     CM met with patient and his spouse. I explained my role, offered assistance  He lives with wife, works full time, drives and is independent in ADLS  He met with Diabetic educator today and has no further questions

## 2025-01-20 NOTE — CARE COORDINATION
Advance Care Planning     General Advance Care Planning (ACP) Conversation    Date of Conversation: 1/20/2025  Conducted with: Patient with Decision Making Capacity  Other persons present:     Healthcare Decision Maker:   Primary Decision Maker: [place] Nguyễn - [facility] - [phone]       Content/Action Overview:  Has NO ACP documents-Information provided  Reviewed DNR/DNI and patient elects Full Code (Attempt Resuscitation)        Length of Voluntary ACP Conversation in minutes:  <16 minutes (Non-Billable)    [place] [facility]

## 2025-01-20 NOTE — DIABETES MGMT
[facility]  DIABETES MANAGEMENT CONSULT    Consulted by Provider for advanced nursing evaluation and care for inpatient blood glucose management.    Evaluation and Action Plan   Rosalio Nguyễn is a 49 year old male patient with new onset DM. The patient is admitted after experiencing severe hyperglycemia Admission glucose was 809. His current A1c of >14% demonstrates hyperglycemia for past 2-3 months. The patient will require insulin at discharge to keep Bg's stable. In anticipation Of discharge I recommend using 0.3xkg/D for basal insulin dosing. The patient BG  trends demonstrates preprandial hyperglycemia , therefore scheduled meal time insulin is appropriate for this patient. He is amenable.   Survival skills discussed. Insulin pen administration practiced with demo pen and fake skin model. A1c goals discussed. BG goals discussed. CGM placed. Scripts sent to pharmacy.     Blood glucose pattern          Diabetes Discharge Plan   Medication  Use 28 units Lantus daily  Use 8 units Novolog with each meal  Monitor Bg's  Follow-up with PCP for future diabetes management   Referral  [x]        Outpatient diabetes education- referral sent           Initial Presentation   Rosalio Nguyễn is a 49 y.o. male who presented to the ED on 1/17/25  LAB: Glucose 809. A1c >14%      HX:   Past Medical History:   Diagnosis Date    Fatty liver     HTN (hypertension) 6/4/2012        INITIAL DX: Uncontrolled type 2 DM with hyperosmolar nonketotic hyperglycemia (HCC) [E11.00]  Hyperosmolar hyperglycemic state (HHS) (HCC) [E11.00]     Current Treatment     TX: Lipitor. Insulin. Lexapro. BP management.     Hospital Course   Clinical progress has been uncomplicated    Diabetes History   Type Diabetes- new onset Type 2 DM  Ambulatory BG management provided by: PCP  Family History:    Lab Results   Component Value Date    LABA1C >14.0 (H) 01/17/2025     Diabetes-related Medical History  Acute

## 2025-01-20 NOTE — DISCHARGE INSTRUCTIONS
HOSPITALIST DISCHARGE INSTRUCTIONS    NAME: Rosalio Nguyễn   :  1975   MRN:  [id number]     Date/Time:  2025 2:08 PM    ADMIT DATE: 2025     DISCHARGE DATE: 2025     DISCHARGE DIAGNOSIS:  New diagnosis of diabetes mellitus, HbA1c> 14  Hyperosmolar nonketotic hyperglycemia  Hypokalemia- resolved   Hypertension  MDD, REMI-chronic  Glaucoma    MEDICATIONS:  As per medication reconciliation  list  It is important that you take the medication exactly as they are prescribed.   Keep your medication in the bottles provided by the pharmacist and keep a list of the medication names, dosages, and times to be taken in your wallet.   Do not take other medications without consulting your doctor.     Pain Management: per above medications    What to do at Home:  Follow up with PCP as scheduled   Continue with glucose monitoring     Recommended diet:  diabetic diet    Recommended activity: activity as tolerated    If you have questions regarding the hospital related prescriptions or hospital related issues please call at .    If you experience any of the following symptoms then please call your primary care physician or return to the emergency room if you cannot get hold of your doctor:  Fever, chills, nausea, vomiting, diarrhea, change in mentation, falling, bleeding, shortness of breath    Follow Up:   @PCP@  you are to call and set up an appointment to see them in 7-10 days.      Information obtained by :  I understand that if any problems occur once I am at home I am to contact my physician.    I understand and acknowledge receipt of the instructions indicated above.                                                                                                                                           Physician's or R.N.'s Signature                                                                  Date/Time

## 2025-01-20 NOTE — PLAN OF CARE
Problem: Chronic Conditions and Co-morbidities  Goal: Patient's chronic conditions and co-morbidity symptoms are monitored and maintained or improved  1/20/2025 1156 by Herminia Barrera RN  Outcome: Progressing  1/19/2025 2349 by Abril Woo RN  Outcome: Progressing     Problem: Discharge Planning  Goal: Discharge to home or other facility with appropriate resources  1/20/2025 1156 by Herminia Barrera RN  Outcome: Progressing  1/19/2025 2349 by Abril Woo RN  Outcome: Progressing

## 2025-01-23 LAB
C PEPTIDE SERPL-MCNC: 1.8 NG/ML (ref 1.1–4.4)
GAD65 AB SER-ACNC: <5 U/ML (ref 0–5)
PANC ISLET CELL AB TITR SER: NEGATIVE

## 2025-02-04 ENCOUNTER — CLINICAL DOCUMENTATION (OUTPATIENT)
Facility: HOSPITAL | Age: 50
End: 2025-02-04

## 2025-02-04 NOTE — PROGRESS NOTES
Concern for hypoglycemia for past couple days. Instructed to decrease Lantus  insulin by 10 units daily, continue to monitor Bg's and follow-up soon with outpatient provider.

## 2025-02-07 ENCOUNTER — OFFICE VISIT (OUTPATIENT)
Age: 50
End: 2025-02-07
Payer: COMMERCIAL

## 2025-02-07 DIAGNOSIS — E11.9 DIABETES MELLITUS, NEW ONSET (HCC): Primary | ICD-10-CM

## 2025-02-07 PROCEDURE — G0108 DIAB MANAGE TRN  PER INDIV: HCPCS

## 2025-02-07 NOTE — PROGRESS NOTES
Medication  Current practices  Do you understand what your diabetes medications do? Yes    How often do you miss doses of your diabetes medications? never    Can you afford your diabetes medications? Yes   Would benefit from DSMES related to Taking Medication: Yes      Takes medications consistently to receive full benefit: Yes      Stage of change: Maintenance     Pt reported taking diabetes medication Lantus as prescribed. Pt reported not taking Novolog insulin as prescribed.    Healthy Coping   Current state  Diabetes Skills, Confidence and Preparedness Index:  Total score: 5.1  Skills: 5  Confidence: 5.1  Preparedness: 5.2       Would benefit from DSMES related to Healthy Coping: Yes      Identifies specific people, organizations,etc, that actively support their diabetes self-care efforts: No      Stage of change: Pre-contemplation     Reducing Risks  Current state  Vaccines:  Influenza: due     Pneumococcal: due     Hepatitis: due     Examinations:  Eye exam: last appointment was: 2025       Dental exam: due    Foot exam: due      Heart Protection:  BP Readings from Last 2 Encounters:   01/20/25 130/87        Lab Results   Component Value Date/Time    LDL 79.8 01/19/2025 02:28 AM        Kidney Protection:  No results found for: \"MCA2\", \"MCAU2\"     Would benefit from DSMES related to Reducing Risks: Yes      Actively participates in decision-making with provider regarding secondary prevention:  Yes      Stage of change: Pre-contemplation   Problem Solving  Current state  Hypoglycemia Management:  What are signs and symptoms of hypoglycemia that you experience: Pt reported being unaware of s/s of hypoglycemia    How do you prevent hypoglycemia: patient is unaware of how to treat low blood sugars    How do you treat hypoglycemia: Rule of 15-Reviewed    Hyperglycemia Management:  What are signs and symptoms of hyperglycemia that you experience: Blurred vision    How can you prevent hyperglycemia: focus on